# Patient Record
Sex: FEMALE | Race: WHITE | NOT HISPANIC OR LATINO | Employment: FULL TIME | URBAN - METROPOLITAN AREA
[De-identification: names, ages, dates, MRNs, and addresses within clinical notes are randomized per-mention and may not be internally consistent; named-entity substitution may affect disease eponyms.]

---

## 2017-03-18 NOTE — TELEPHONE ENCOUNTER
Birth control      Last Written Prescription Date: 5/31/16  Last Fill Quantity: 84,  # refills: 3   Last Office Visit with G, [facility] or [facility] prescribing provider: 8/15/16

## 2017-03-18 NOTE — TELEPHONE ENCOUNTER
Routing refill request to provider for review/approval because:  Previous provider has left [place], need new provider to sign off on RX.  Svetlana Arita, PharmD.  [facility] [phone]

## 2017-03-20 RX ORDER — LEVONORGESTREL AND ETHINYL ESTRADIOL 0.15-0.03
1 KIT ORAL DAILY
Qty: 84 TABLET | Refills: 0 | Status: SHIPPED | OUTPATIENT
Start: 2017-03-20 | End: 2017-03-23

## 2017-03-23 ENCOUNTER — OFFICE VISIT (OUTPATIENT)
Dept: OBGYN | Facility: CLINIC | Age: 32
End: 2017-03-23
Payer: COMMERCIAL

## 2017-03-23 VITALS
BODY MASS INDEX: 28.72 KG/M2 | SYSTOLIC BLOOD PRESSURE: 130 MMHG | DIASTOLIC BLOOD PRESSURE: 70 MMHG | HEART RATE: 64 BPM | WEIGHT: 183 LBS | HEIGHT: 67 IN

## 2017-03-23 DIAGNOSIS — Z01.419 ENCOUNTER FOR GYNECOLOGICAL EXAMINATION WITHOUT ABNORMAL FINDING: Primary | ICD-10-CM

## 2017-03-23 DIAGNOSIS — Z30.41 ENCOUNTER FOR SURVEILLANCE OF CONTRACEPTIVE PILLS: ICD-10-CM

## 2017-03-23 PROCEDURE — 99385 PREV VISIT NEW AGE 18-39: CPT | Performed by: PHYSICIAN ASSISTANT

## 2017-03-23 PROCEDURE — 87624 HPV HI-RISK TYP POOLED RSLT: CPT | Performed by: PHYSICIAN ASSISTANT

## 2017-03-23 PROCEDURE — G0145 SCR C/V CYTO,THINLAYER,RESCR: HCPCS | Performed by: PHYSICIAN ASSISTANT

## 2017-03-23 RX ORDER — LEVONORGESTREL AND ETHINYL ESTRADIOL 0.15-0.03
1 KIT ORAL DAILY
Qty: 84 TABLET | Refills: 3 | Status: SHIPPED | OUTPATIENT
Start: 2017-03-23 | End: 2018-03-28

## 2017-03-23 ASSESSMENT — ANXIETY QUESTIONNAIRES
2. NOT BEING ABLE TO STOP OR CONTROL WORRYING: NOT AT ALL
GAD7 TOTAL SCORE: 0
5. BEING SO RESTLESS THAT IT IS HARD TO SIT STILL: NOT AT ALL
3. WORRYING TOO MUCH ABOUT DIFFERENT THINGS: NOT AT ALL
6. BECOMING EASILY ANNOYED OR IRRITABLE: NOT AT ALL
IF YOU CHECKED OFF ANY PROBLEMS ON THIS QUESTIONNAIRE, HOW DIFFICULT HAVE THESE PROBLEMS MADE IT FOR YOU TO DO YOUR WORK, TAKE CARE OF THINGS AT HOME, OR GET ALONG WITH OTHER PEOPLE: NOT DIFFICULT AT ALL
7. FEELING AFRAID AS IF SOMETHING AWFUL MIGHT HAPPEN: NOT AT ALL
1. FEELING NERVOUS, ANXIOUS, OR ON EDGE: NOT AT ALL

## 2017-03-23 ASSESSMENT — PATIENT HEALTH QUESTIONNAIRE - PHQ9: 5. POOR APPETITE OR OVEREATING: NOT AT ALL

## 2017-03-23 NOTE — PROGRESS NOTES
Meghann is a 31 year old  female who presents for annual exam.     Besides routine health maintenance, she has no other health concerns today .    HPI:  New pt to clinic. No concerns other than would like refill of ocps. Has been taking for several years and is happy with this method of BC.   Periods regular, light.     Monogamous relationship, declines std screening.   Has not had co-testing, will complete pap and HPV today.     Works as  at [employer]  +exercise, 2-3 days a week.   Moods are generally good.     GYNECOLOGIC HISTORY:    Patient's last menstrual period was 2017 (approximate).  Her current contraception method is: oral contraceptives.  She  reports that she has never smoked. She has never used smokeless tobacco.    Patient is sexually active.  STD testing offered?  Declined  Last PHQ-9 score on record =   PHQ-9 SCORE 3/23/2017   Total Score 1     Last GAD7 score on record =   KRYSTYNA-7 SCORE 3/23/2017   Total Score 0     Alcohol Score = 4    HEALTH MAINTENANCE:  Cholesterol  Cholesterol   Date Value Ref Range Status   2013 176 0 - 200 mg/dL Final     Comment:     LDL Cholesterol is the primary guide to therapy.   The NCEP recommends further evaluation of: patients with cholesterol greater   than 200 mg/dL if additional risk factors are present, cholesterol greater   than   240 mg/dL, triglycerides greater than 150 mg/dL, or HDL less than 40 mg/dL.     Last Mammo: Never, Result: not applicable  Pap:   Lab Results   Component Value Date    PAP NIL 2016    PAP NIL 2013     Colonoscopy:  Never, Result: not applicable  Dexa:  Never    Health maintenance updated:  yes    HISTORY:  Obstetric History       T0      TAB0   SAB0   E0   M0   L0           Patient Active Problem List   Diagnosis     NO ACTIVE PROBLEMS     Fungal skin infection     Family history of basal cell carcinoma     Family history of squamous cell carcinoma of skin     History reviewed. No  "pertinent surgical history.   Social History   Substance Use Topics     Smoking status: Never Smoker     Smokeless tobacco: Never Used     Alcohol use 5.0 oz/week     10 Standard drinks or equivalent per week      Problem (# of Occurrences) Relation (Name,Age of Onset)    CANCER (1) Paternal Aunt: cervical cancer    CEREBROVASCULAR DISEASE (2) Father, Paternal Grandfather    DIABETES (2) Paternal Grandfather, Paternal Uncle    High cholesterol (1) Father    Skin Cancer (1) Father            Current Outpatient Prescriptions   Medication Sig     levonorgestrel-ethinyl estradiol (NORDETTE) 0.15-30 MG-MCG per tablet Take 1 tablet by mouth daily     [DISCONTINUED] levonorgestrel-ethinyl estradiol (NORDETTE) 0.15-30 MG-MCG per tablet Take 1 tablet by mouth daily     fluconazole (DIFLUCAN) 150 MG tablet Take 1 tablet (150 mg) by mouth every 3 days (Patient not taking: Reported on 3/23/2017)     No current facility-administered medications for this visit.      Allergies   Allergen Reactions     Sulfa Drugs Hives       Past medical, surgical, social and family histories were reviewed and updated in EPIC.    ROS:   12 point review of systems negative other than symptoms noted below.  Skin: Rash    EXAM:  /70  Pulse 64  Ht 5' 6.5\" (1.689 m)  Wt 183 lb (83 kg)  LMP 02/26/2017 (Approximate)  BMI 29.09 kg/m2   BMI: Body mass index is 29.09 kg/(m^2).    PHYSICAL EXAM:  Constitutional:  Appearance: Well nourished, well developed, alert, in no acute distress  Neck:  Lymph Nodes:  No lymphadenopathy present    Thyroid:  Gland size normal, nontender, no nodules or masses present  on palpation  Chest:  Respiratory Effort:  Breathing unlabored  Cardiovascular:    Heart: Auscultation:  Regular rate, normal rhythm, no murmurs present  Breasts: Inspection of Breasts:  No lymphadenopathy present    Palpation of Breasts and Axillae:  No masses present on palpation, no  breast tenderness; fibrocystic changes present bilaterally. "     Axillary Lymph Nodes:  No lymphadenopathy present  Gastrointestinal:   Abdominal Examination:  Abdomen nontender to palpation, tone normal without rigidity or guarding, no masses present, umbilicus without lesions   Liver and Spleen:  No hepatomegaly present, liver nontender to palpation    Hernias:  No hernias present  Lymphatic: Lymph Nodes:  No other lymphadenopathy present  Skin:  General Inspection:  No rashes present, no lesions present, no areas of  discoloration    Genitalia and Groin:  No rashes present, no lesions present, no areas of  discoloration, no masses present  Neurologic/Psychiatric:    Mental Status:  Oriented X3     Pelvic Exam:  External Genitalia:     Normal appearance for age, no discharge present, no tenderness present, no inflammatory lesions present, color normal  Vagina:     Normal vaginal vault without central or paravaginal defects, no discharge present, no inflammatory lesions present, no masses present  Bladder:     Nontender to palpation  Urethra:   Urethral Body:  Urethra palpation normal, urethra structural support normal   Urethral Meatus:  No erythema or lesions present  Cervix:     Appearance healthy, no lesions present, nontender to palpation, no bleeding present  Uterus:     Nontender to palpation, no masses present, position anteflexed, mobility: normal  Adnexa:     No adnexal tenderness present, no adnexal masses present  Perineum:     Perineum within normal limits, no evidence of trauma, no rashes or skin lesions present  Anus:     Anus within normal limits, no hemorrhoids present  Inguinal Lymph Nodes:     No lymphadenopathy present  Pubic Hair:     Normal pubic hair distribution for age  Genitalia and Groin:     No rashes present, no lesions present, no areas of discoloration, no masses present    COUNSELING:   Reviewed preventive health counseling, as reflected in patient instructions    BMI: Body mass index is 29.09 kg/(m^2).      ASSESSMENT:  31 year old female  with satisfactory annual exam.    ICD-10-CM    1. Encounter for gynecological examination without abnormal finding Z01.419 Pap imaged thin layer screen with HPV - recommended age 30 - 65     HPV High Risk Types DNA Cervical   2. Encounter for surveillance of contraceptive pills Z30.41 levonorgestrel-ethinyl estradiol (NORDETTE) 0.15-30 MG-MCG per tablet       PLAN:  Risks, benefits, and side effects of medications reviewed.  PHQ-9/KRYSTYNA-7 scores were discussed.  Alcohol score discussed.  Lab and pap smear results will be called to the patient.  Usual safety and preventative measures counseling done.  BMI >25  Doing well with current ocps, refilled as above.     Janae Loya PA-C

## 2017-03-23 NOTE — MR AVS SNAPSHOT
"              After Visit Summary   3/23/2017    Meghann Spencre    MRN: [id number]           Patient Information     Date Of Birth          1985        Visit Information        Provider Department      3/23/2017 3:00 PM Janae Loya PA-C [facility]        Today's Diagnoses     Encounter for gynecological examination without abnormal finding    -  1    Encounter for surveillance of contraceptive pills           Follow-ups after your visit        Who to contact     If you have questions or need follow up information about today's clinic visit or your schedule please contact [facility] directly at [phone].  Normal or non-critical lab and imaging results will be communicated to you by MyChart, letter or phone within 4 business days after the clinic has received the results. If you do not hear from us within 7 days, please contact the clinic through Wizerhart or phone. If you have a critical or abnormal lab result, we will notify you by phone as soon as possible.  Submit refill requests through AdECN or call your pharmacy and they will forward the refill request to us. Please allow 3 business days for your refill to be completed.          Additional Information About Your Visit        MyChart Information     AdECN gives you secure access to your electronic health record. If you see a primary care provider, you can also send messages to your care team and make appointments. If you have questions, please call your primary care clinic.  If you do not have a primary care provider, please call [phone] and they will assist you.        Care EveryWhere ID     This is your Care EveryWhere ID. This could be used by other organizations to access your [facility] medical records  [id number]        Your Vitals Were     Pulse Height Last Period BMI (Body Mass Index)          64 5' 6.5\" (1.689 m) 02/26/2017 (Approximate) 29.09 kg/m2         Blood Pressure from Last 3 " Encounters:   03/23/17 130/70   05/27/16 126/81   06/29/15 114/72    Weight from Last 3 Encounters:   03/23/17 183 lb (83 kg)   05/27/16 178 lb 9.6 oz (81 kg)   06/29/15 190 lb (86.2 kg)              We Performed the Following     HPV High Risk Types DNA Cervical     Pap imaged thin layer screen with HPV - recommended age 30 - 65          Where to get your medicines      These medications were sent to [facility] - [place], [place] - [address]  [address], [place] [address]     Phone:  [phone]     levonorgestrel-ethinyl estradiol 0.15-30 MG-MCG per tablet          Primary Care Provider    None Specified       No primary provider on file.        Thank you!     Thank you for choosing [facility]  for your care. Our goal is always to provide you with excellent care. Hearing back from our patients is one way we can continue to improve our services. Please take a few minutes to complete the written survey that you may receive in the mail after your visit with us. Thank you!             Your Updated Medication List - Protect others around you: Learn how to safely use, store and throw away your medicines at www.disposemymeds.org.          This list is accurate as of: 3/23/17  3:16 PM.  Always use your most recent med list.                   Brand Name Dispense Instructions for use    fluconazole 150 MG tablet    DIFLUCAN    4 tablet    Take 1 tablet (150 mg) by mouth every 3 days       levonorgestrel-ethinyl estradiol 0.15-30 MG-MCG per tablet    NORDETTE    84 tablet    Take 1 tablet by mouth daily

## 2017-03-23 NOTE — LETTER
April 7, 2017    Meghann Spencer  [address] [facility] [address]    Dear Meghann,  We are happy to inform you that your PAP smear result from 3/23/2017 is normal.  We are now able to do a follow up test on PAP smears. The DNA test is for HPV (Human Papilloma Virus). Cervical cancer is closely linked with certain types of HPV. Your result showed no evidence of high risk HPV.  Therefore we recommend you return in 5 years for your next pap smear.  You will still need to return to the clinic every year for an annual exam and other preventive tests.  Please contact the clinic at [phone] with any questions.  Sincerely,    Janae Loya PA-C, PHILIPP

## 2017-03-24 ASSESSMENT — ANXIETY QUESTIONNAIRES: GAD7 TOTAL SCORE: 0

## 2017-03-24 ASSESSMENT — PATIENT HEALTH QUESTIONNAIRE - PHQ9: SUM OF ALL RESPONSES TO PHQ QUESTIONS 1-9: 1

## 2017-03-27 LAB
COPATH REPORT: NORMAL
PAP: NORMAL

## 2017-03-28 LAB
FINAL DIAGNOSIS: NORMAL
HPV HR 12 DNA CVX QL NAA+PROBE: NEGATIVE
HPV16 DNA SPEC QL NAA+PROBE: NEGATIVE
HPV18 DNA SPEC QL NAA+PROBE: NEGATIVE
SPECIMEN DESCRIPTION: NORMAL

## 2017-04-19 ENCOUNTER — MYC REFILL (OUTPATIENT)
Dept: FAMILY MEDICINE | Facility: CLINIC | Age: 32
End: 2017-04-19

## 2017-04-19 DIAGNOSIS — B36.0 TINEA VERSICOLOR: ICD-10-CM

## 2017-04-20 NOTE — TELEPHONE ENCOUNTER
Message from MyChart:  Original authorizing provider: Moira Bah MD    Meghann Spencer would like a refill of the following medications:  fluconazole (DIFLUCAN) 150 MG tablet [Moira Bah MD]    Preferred pharmacy: [facility] - [place], [place] - [address]    Comment:

## 2017-04-20 NOTE — TELEPHONE ENCOUNTER
flucinazole      Last Written Prescription Date: 08/15/16  Last Fill Quantity: 4,  # refills: 1   [facility] Office [facility] with [facility] or [facility] prescribing provider: 08/15/16    Routing refill request to provider for review/approval because:  Drug not on the [facility] refill protocol     Maria Ines Medeiros RN  [facility]  [phone]'

## 2017-04-21 RX ORDER — FLUCONAZOLE 150 MG/1
150 TABLET ORAL
Qty: 4 TABLET | Refills: 1 | Status: SHIPPED | OUTPATIENT
Start: 2017-04-21 | End: 2023-11-27

## 2018-03-28 ENCOUNTER — OFFICE VISIT (OUTPATIENT)
Dept: OBGYN | Facility: CLINIC | Age: 33
End: 2018-03-28
Payer: COMMERCIAL

## 2018-03-28 VITALS
SYSTOLIC BLOOD PRESSURE: 120 MMHG | BODY MASS INDEX: 26.53 KG/M2 | DIASTOLIC BLOOD PRESSURE: 70 MMHG | HEIGHT: 67 IN | WEIGHT: 169 LBS

## 2018-03-28 DIAGNOSIS — Z30.41 ENCOUNTER FOR SURVEILLANCE OF CONTRACEPTIVE PILLS: ICD-10-CM

## 2018-03-28 DIAGNOSIS — Z01.419 ENCOUNTER FOR GYNECOLOGICAL EXAMINATION WITHOUT ABNORMAL FINDING: Primary | ICD-10-CM

## 2018-03-28 PROCEDURE — 99395 PREV VISIT EST AGE 18-39: CPT | Performed by: OBSTETRICS & GYNECOLOGY

## 2018-03-28 RX ORDER — LEVONORGESTREL AND ETHINYL ESTRADIOL 0.15-0.03
1 KIT ORAL DAILY
Qty: 84 TABLET | Refills: 3 | Status: SHIPPED | OUTPATIENT
Start: 2018-03-28 | End: 2019-10-01 | Stop reason: ALTCHOICE

## 2018-03-28 RX ORDER — CETIRIZINE HYDROCHLORIDE 10 MG/1
10 TABLET ORAL DAILY
COMMUNITY

## 2018-03-28 NOTE — NURSING NOTE
"Chief Complaint   Patient presents with     Physical       Initial There were no vitals taken for this visit. Estimated body mass index is 29.09 kg/(m^2) as calculated from the following:    Height as of 3/23/17: 5' 6.5\" (1.689 m).    Weight as of 3/23/17: 183 lb (83 kg).  BP completed using cuff size: regular        The following HM Due: NONE      The following patient reported/Care Every where data was sent to:  P ABSTRACT QUALITY INITIATIVES [20234]  none      n/a              "

## 2018-03-28 NOTE — PROGRESS NOTES
Meghann is a 32 year old  female who presents for annual exam.  Using oral contraceptive pills for contraception, has noted decreased libido, decreased sexual response in the past few years, wonders about role of oral contraceptive pills in these symptoms.  Had also gained some weight, thought this might impact sexual response, but has now lost weight and things didn't improve.  Does not plan pregnancy immediately, wonders about other contraception.     We discussed options, including Nexplanon, barrier methods.  Option of IUD was discussed.  Risks and benefits were reviewed.  Risk of perforation, and subsequent need for surgical removal with laparoscopy or laparotomy was reviewed.  Mirena would be her likely choice.     She will consider options, will call to schedule Mirena insertion (could be done at any time in her pill cycle if pills taken consistently).  For now she will continue the pills.  Also discussed [facility] as resource option.       Menses are regular q 28-30 days and normal lasting 4 days.  Menses flow: normal.  Patient's last menstrual period was 2018.. Using oral contraceptives for contraception.  She is not currently considering pregnancy.  Besides routine health maintenance, she has no other health concerns today .  GYNECOLOGIC HISTORY:  Menarche: 0    Meghann is sexually active with 1male partner(s) and is currently in monogamous relationship with boyfriend.    History sexually transmitted infections:No STD history  STI testing offered?  Declined  CLARK exposure: No  History of abnormal Pap smear: NO - age 30- 65 PAP every 3 years recommended  Family history of breast CA: No  Family history of uterine/ovarian CA: Yes (Please explain): p aunt cervical cancer    Family history of colon CA: No    HEALTH MAINTENANCE:  Cholesterol: (  Cholesterol   Date Value Ref Range Status   2013 176 0 - 200 mg/dL Final     Comment:     LDL Cholesterol is the primary guide to  therapy.   The NCEP recommends further evaluation of: patients with cholesterol greater   than 200 mg/dL if additional risk factors are present, cholesterol greater   than   240 mg/dL, triglycerides greater than 150 mg/dL, or HDL less than 40 mg/dL.    History of abnormal lipids: No  Mammo: 0 . History of abnormal Mammo: Not applicable, 0.  Regular Self Breast Exams: Yes  Calcium/Vitamin D intake: source:  dairy, dietary supplement(s) Adequate? Yes  TSH: (  TSH   Date Value Ref Range Status   2015 1.30 0.40 - 4.00 mU/L Final    )  Pap; (  Lab Results   Component Value Date    PAP NIL 2017    PAP NIL 2016    PAP NIL 2013    )    HISTORY:  Obstetric History       T0      L0     SAB0   TAB0   Ectopic0   Multiple0   Live Births0         Past Medical History:   Diagnosis Date     NO ACTIVE PROBLEMS      No past surgical history on file.  Family History   Problem Relation Age of Onset     CEREBROVASCULAR DISEASE Father      High cholesterol Father      Skin Cancer Father      CEREBROVASCULAR DISEASE Paternal Grandfather      DIABETES Paternal Grandfather      DIABETES Paternal Uncle      CANCER Paternal Aunt      cervical cancer     Social History     Social History     Marital status: Single     Spouse name: N/A     Number of children: N/A     Years of education: N/A     Social History Main Topics     Smoking status: Never Smoker     Smokeless tobacco: Never Used     Alcohol use 5.0 oz/week     10 Standard drinks or equivalent per week     Drug use: No     Sexual activity: Yes     Partners: Male     Birth control/ protection: , Pill     Other Topics Concern     None     Social History Narrative       Current Outpatient Prescriptions:      cetirizine (ZYRTEC) 10 MG tablet, Take 10 mg by mouth daily, Disp: , Rfl:      levonorgestrel-ethinyl estradiol (NORDETTE) 0.15-30 MG-MCG per tablet, Take 1 tablet by mouth daily, Disp: 84 tablet, Rfl: 3     fluconazole (DIFLUCAN) 150 MG tablet,  "Take 1 tablet (150 mg) by mouth every 3 days (Patient not taking: Reported on 3/28/2018), Disp: 4 tablet, Rfl: 1     Allergies   Allergen Reactions     Dust Mites      Sulfa Drugs Hives       Past medical, surgical, social and family history were reviewed and updated in EPIC.    ROS:   C:     NEGATIVE for fever, chills, change in weight  I:       NEGATIVE for worrisome rashes, moles or lesions  E:     NEGATIVE for vision changes or irritation  E/M: NEGATIVE for ear, mouth and throat problems  R:     NEGATIVE for significant cough or SOB  CV:   NEGATIVE for chest pain, palpitations or peripheral edema  GI:     NEGATIVE for nausea, abdominal pain, heartburn, or change in bowel habits  :   NEGATIVE for frequency, dysuria, hematuria, vaginal discharge, or irregular bleeding  M:     NEGATIVE for significant arthralgias or myalgia  N:      NEGATIVE for weakness, dizziness or paresthesias  E:      NEGATIVE for temperature intolerance, skin/hair changes  P:      NEGATIVE for changes in mood or affect.    EXAM:  /70  Ht 5' 7\" (1.702 m)  Wt 169 lb (76.7 kg)  LMP 03/25/2018  Breastfeeding? No  BMI 26.47 kg/m2   BMI: Body mass index is 26.47 kg/(m^2).  Constitutional: healthy, alert and no distress  Head: Normocephalic. No masses, lesions, tenderness or abnormalities  Neck: Neck supple. Trachea midline. No adenopathy. Thyroid symmetric, normal size.   Cardiovascular: RRR.   Respiratory: Negative.   Breast: No nodularity, asymmetry or nipple discharge bilaterally.  Gastrointestinal: Abdomen soft, non-tender, non-distended. No masses, organomegaly.  :  Vulva:  No external lesions, normal female hair distribution, no inguinal adenopathy.    Urethra:  Midline, non-tender, well supported, no discharge  Vagina:  Moist, pink, no abnormal discharge, no lesions  Uterus:  Normal size, non-tender, freely mobile  Ovaries:  No masses appreciated, non-tender, mobile  Rectal Exam: deferred  Musculoskeletal: extremities " normal  Skin: no suspicious lesions or rashes  Psychiatric: Affect appropriate, cooperative,mentation appears normal.     COUNSELING:   Reviewed preventive health counseling, as reflected in patient instructions       Contraception   reports that she has never smoked. She has never used smokeless tobacco.    Body mass index is 26.47 kg/(m^2).  Weight management plan: diet and exercise  FRAX Risk Assessment    ASSESSMENT:  32 year old female with satisfactory annual exam  (Z01.419) Encounter for gynecological examination without abnormal finding  (primary encounter diagnosis)  Comment:   Plan:     (Z30.41) Encounter for surveillance of contraceptive pills  Comment:   Plan: levonorgestrel-ethinyl estradiol (NORDETTE)         0.15-30 MG-MCG per tablet

## 2018-03-28 NOTE — MR AVS SNAPSHOT
"              After Visit Summary   3/28/2018    Meghann Spencer    MRN: [id number]           Patient Information     Date Of Birth          1985        Visit Information        Provider Department      3/28/2018 1:00 PM Christa Sylvester MD [facility]        Today's Diagnoses     Encounter for gynecological examination without abnormal finding    -  1    Encounter for surveillance of contraceptive pills           Follow-ups after your visit        Who to contact     If you have questions or need follow up information about today's clinic visit or your schedule please contact [facility] directly at [phone].  Normal or non-critical lab and imaging results will be communicated to you by MyChart, letter or phone within 4 business days after the clinic has received the results. If you do not hear from us within 7 days, please contact the clinic through Acustreamhart or phone. If you have a critical or abnormal lab result, we will notify you by phone as soon as possible.  Submit refill requests through iCIMS or call your pharmacy and they will forward the refill request to us. Please allow 3 business days for your refill to be completed.          Additional Information About Your Visit        MyChart Information     iCIMS gives you secure access to your electronic health record. If you see a primary care provider, you can also send messages to your care team and make appointments. If you have questions, please call your primary care clinic.  If you do not have a primary care provider, please call [phone] and they will assist you.        Care EveryWhere ID     This is your Care EveryWhere ID. This could be used by other organizations to access your [facility] medical records  [id number]        Your Vitals Were     Height Last Period Breastfeeding? BMI (Body Mass Index)          5' 7\" (1.702 m) 03/25/2018 No 26.47 kg/m2         Blood Pressure from Last 3 Encounters:   03/28/18 120/70 "   03/23/17 130/70   05/27/16 126/81    Weight from Last 3 Encounters:   03/28/18 169 lb (76.7 kg)   03/23/17 183 lb (83 kg)   05/27/16 178 lb 9.6 oz (81 kg)              Today, you had the following     No orders found for display         Where to get your medicines      These medications were sent to [facility] - [place], [place] - [address] [facility]  [address] [facility], [place] [address]     Phone:  [phone]     levonorgestrel-ethinyl estradiol 0.15-30 MG-MCG per tablet          Primary Care Provider Fax #    Physician No Ref-Primary [phone]       No address on file        Equal Access to Services     JAYME VERMA : Tor Little, waricki prak, qaybta kaalmajak perez, marie garnica. [facility] [phone].    ATENCIÓN: Si habla español, tiene a torres disposición servicios gratuitos de asistencia lingüística. [facility] [phone].    We comply with applicable federal civil rights laws and [place] laws. We do not discriminate on the basis of race, color, national origin, age, disability, sex, sexual orientation, or gender identity.            Thank you!     Thank you for choosing [facility]  for your care. Our goal is always to provide you with excellent care. Hearing back from our patients is one way we can continue to improve our services. Please take a few minutes to complete the written survey that you may receive in the mail after your visit with us. Thank you!             Your Updated Medication List - Protect others around you: Learn how to safely use, store and throw away your medicines at www.disposemymeds.org.          This list is accurate as of 3/28/18  1:28 PM.  Always use your most recent med list.                   Brand Name Dispense Instructions for use Diagnosis    cetirizine 10 MG tablet    zyrTEC     Take 10 mg by mouth daily        fluconazole 150 MG tablet    DIFLUCAN    4 tablet    Take 1 tablet (150 mg) by  mouth every 3 days    Tinea versicolor       levonorgestrel-ethinyl estradiol 0.15-30 MG-MCG per tablet    BRAULIO    84 tablet    Take 1 tablet by mouth daily    Encounter for surveillance of contraceptive pills

## 2018-08-15 ENCOUNTER — OFFICE VISIT (OUTPATIENT)
Dept: OBGYN | Facility: CLINIC | Age: 33
End: 2018-08-15
Payer: COMMERCIAL

## 2018-08-15 VITALS
WEIGHT: 171 LBS | BODY MASS INDEX: 26.84 KG/M2 | SYSTOLIC BLOOD PRESSURE: 135 MMHG | DIASTOLIC BLOOD PRESSURE: 98 MMHG | HEIGHT: 67 IN | HEART RATE: 73 BPM

## 2018-08-15 DIAGNOSIS — Z30.430 ENCOUNTER FOR INSERTION OF MIRENA IUD: Primary | ICD-10-CM

## 2018-08-15 LAB — BETA HCG QUAL IFA URINE: NEGATIVE

## 2018-08-15 PROCEDURE — 58300 INSERT INTRAUTERINE DEVICE: CPT | Performed by: OBSTETRICS & GYNECOLOGY

## 2018-08-15 PROCEDURE — 84703 CHORIONIC GONADOTROPIN ASSAY: CPT | Performed by: OBSTETRICS & GYNECOLOGY

## 2018-08-15 NOTE — MR AVS SNAPSHOT
"              After Visit Summary   8/15/2018    Meghann Spencer    MRN: [id number]           Patient Information     Date Of Birth          1985        Visit Information        Provider Department      8/15/2018 1:30 PM Christa Sylvester MD [facility]        Today's Diagnoses     Encounter for insertion of mirena IUD    -  1       Follow-ups after your visit        Who to contact     If you have questions or need follow up information about today's clinic visit or your schedule please contact [facility] directly at [phone].  Normal or non-critical lab and imaging results will be communicated to you by elenihart, letter or phone within 4 business days after the clinic has received the results. If you do not hear from us within 7 days, please contact the clinic through Singspielt or phone. If you have a critical or abnormal lab result, we will notify you by phone as soon as possible.  Submit refill requests through Digby or call your pharmacy and they will forward the refill request to us. Please allow 3 business days for your refill to be completed.          Additional Information About Your Visit        MyChart Information     Digby gives you secure access to your electronic health record. If you see a primary care provider, you can also send messages to your care team and make appointments. If you have questions, please call your primary care clinic.  If you do not have a primary care provider, please call [phone] and they will assist you.        Care EveryWhere ID     This is your Care EveryWhere ID. This could be used by other organizations to access your [place] medical records  [id number]        Your Vitals Were     Pulse Height Last Period Breastfeeding? BMI (Body Mass Index)       73 5' 7\" (1.702 m) 08/05/2018 No 26.78 kg/m2        Blood Pressure from Last 3 Encounters:   08/15/18 (!) 135/98   03/28/18 120/70   03/23/17 130/70    Weight from Last 3 Encounters:   08/15/18 " 171 lb (77.6 kg)   03/28/18 169 lb (76.7 kg)   03/23/17 183 lb (83 kg)              We Performed the Following     Beta HCG qual IFA urine     INSERTION INTRAUTERINE DEVICE          Today's Medication Changes          These changes are accurate as of 8/15/18  1:51 PM.  If you have any questions, ask your nurse or doctor.               Start taking these medicines.        Dose/Directions    levonorgestrel 20 MCG/24HR IUD   Commonly known as:  MIRENA   Used for:  Encounter for insertion of mirena IUD   Started by:  Christa Sylvester MD        Dose:  1 each   1 each (20 mcg) by Intrauterine route once for 1 dose   Quantity:  1 each   Refills:  0            Where to get your medicines      Some of these will need a paper prescription and others can be bought over the counter.  Ask your nurse if you have questions.     You don't need a prescription for these medications     levonorgestrel 20 MCG/24HR IUD                Primary Care Provider Fax #    Physician No Ref-Primary [phone]       No address on file        Equal Access to Services     [facility] : Hadii aad ku hadasho Soomaali, waaxda luqadaha, qaybta kaalmada adeegyada, waxay trav shah . So [facility] [phone].    ATENCIÓN: Si habla español, tiene a torres disposición servicios gratuitos de asistencia lingüística. Llame al [phone].    We comply with applicable federal civil rights laws and [place] laws. We do not discriminate on the basis of race, color, national origin, age, disability, sex, sexual orientation, or gender identity.            Thank you!     Thank you for choosing [facility]  for your care. Our goal is always to provide you with excellent care. Hearing back from our patients is one way we can continue to improve our services. Please take a few minutes to complete the written survey that you may receive in the mail after your visit with us. Thank you!             Your Updated Medication List - Protect  others around you: Learn how to safely use, store and throw away your medicines at www.disposemymeds.org.          This list is accurate as of 8/15/18  1:51 PM.  Always use your most recent med list.                   Brand Name Dispense Instructions for use Diagnosis    cetirizine 10 MG tablet    zyrTEC     Take 10 mg by mouth daily        fluconazole 150 MG tablet    DIFLUCAN    4 tablet    Take 1 tablet (150 mg) by mouth every 3 days    Tinea versicolor       levonorgestrel 20 MCG/24HR IUD    MIRENA    1 each    1 each (20 mcg) by Intrauterine route once for 1 dose    Encounter for insertion of mirena IUD       levonorgestrel-ethinyl estradiol 0.15-30 MG-MCG per tablet    NORDETTE    84 tablet    Take 1 tablet by mouth daily    Encounter for surveillance of contraceptive pills

## 2018-08-15 NOTE — PROGRESS NOTES
"Meghann Spencer is a 33 year old female who presents for IUD insertion.  See previous GYN notes.  We reviewed risks and benefits, and her questions were answered.    OBJECTIVE: healthy female in NAD.  BP (!) 135/98  Pulse 73  Ht 5' 7\" (1.702 m)  Wt 171 lb (77.6 kg)  LMP 08/05/2018  Breastfeeding? No  BMI 26.78 kg/m2.  The uterus was normal sized, mid-position.  Speculum was placed in the vagina, and hibiclens prep used.  The cervix was grasped anteriorly with a tenaculum. The uterus sounded to 7 cm.  Using standard technique, a Mirena IUD was placed in the endometrial cavity without difficulty.  The string was trimmed.  The instruments were removed.  She tolerated the procedure well.    ASSESSMENT: IUD placement.    PLAN: RTC routinely or prn.  Standard precautions.  She will continue oral contraceptive pills for at least 7 days, will check the string at that time.   "

## 2019-10-01 ENCOUNTER — OFFICE VISIT (OUTPATIENT)
Dept: FAMILY MEDICINE | Facility: CLINIC | Age: 34
End: 2019-10-01
Payer: COMMERCIAL

## 2019-10-01 DIAGNOSIS — R07.0 THROAT PAIN: Primary | ICD-10-CM

## 2019-10-01 LAB
DEPRECATED S PYO AG THROAT QL EIA: NORMAL
SPECIMEN SOURCE: NORMAL

## 2019-10-01 PROCEDURE — 87880 STREP A ASSAY W/OPTIC: CPT | Performed by: FAMILY MEDICINE

## 2019-10-01 PROCEDURE — 87081 CULTURE SCREEN ONLY: CPT | Performed by: FAMILY MEDICINE

## 2019-10-01 PROCEDURE — 99202 OFFICE O/P NEW SF 15 MIN: CPT | Performed by: FAMILY MEDICINE

## 2019-10-01 NOTE — PROGRESS NOTES
Subjective     Meghann Spencer is a 34 year old female who presents to clinic today for the following health issues:    HPI   Acute Illness   Acute illness concerns: sore throat   Onset: 3 day     Fever: no     Chills/Sweats: YES- sweats     Headache (location?): no     Sinus Pressure:no    Conjunctivitis:  No,     Ear Pain: no    Rhinorrhea: no     Congestion: no     Sore Throat: no      Cough: no    Wheeze: no     Decreased Appetite: no     Nausea: no     Vomiting: no     Diarrhea:  no     Dysuria/Freq.: no     Fatigue/Achiness: no     Sick/Strep Exposure: no      Therapies Tried and outcome: ibuprofen 800 mg QH PRN     No exposure to sick contact with mono.       Reviewed and updated as needed this visit by Provider         Review of Systems   ROS COMP: Constitutional, HEENT, cardiovascular, pulmonary, gi and gu systems are negative, except as otherwise noted.      Objective    There were no vitals taken for this visit.  There is no height or weight on file to calculate BMI.  Physical Exam   BP (P) 122/80   Pulse (P) 77   Temp (P) 98.1  F (36.7  C) (Oral)   Wt (P) 86 kg (189 lb 8 oz)   SpO2 (P) 100%   BMI (P) 29.68 kg/m    GENERAL: healthy, alert and no distress  EYES: Eyes grossly normal to inspection  HENT: ear canals and TM's normal, nose and mouth without ulcers or lesions  NECK: no adenopathy, no asymmetry, masses, or scars and thyroid normal to palpation  RESP: lungs clear to auscultation - no rales, rhonchi or wheezes  CV: regular rate and rhythm, normal S1 S2    Diagnostic Test Results:  Labs reviewed in Epic  Results for orders placed or performed in visit on 10/01/19 (from the past 24 hour(s))   Strep, Rapid Screen   Result Value Ref Range    Specimen Description Throat     Rapid Strep A Screen       NEGATIVE: No Group A streptococcal antigen detected by immunoassay, await culture report.           Assessment & Plan     1. Throat pain  - Strep, Rapid Screen  - Beta strep group A culture  - recommended  symptomatic tx  - Follow if symptoms worsen or fail to improve.      Maria Isabel Orosco MD  [facility]

## 2019-10-02 LAB
BACTERIA SPEC CULT: NORMAL
SPECIMEN SOURCE: NORMAL

## 2020-01-07 NOTE — PROGRESS NOTES
"[facility] - PRIMARY CARE SKIN    CC: Lesion(s)  SUBJECTIVE:   Meghann Spencer is a(n) 34 year old female who presents to clinic today because of irritation in the right ear that comes and goes .    Issue One:   She had an \" abrasion \" in the right ear that comes and goes. Present for years.  Uses sunscreen : YES, frequency : occasionally when outdoors, SPF : 30.  Previous history of significant sun exposure - blistering sunburns : YES - four times previously, tanning beds : NO.     Personal history of skin cancer : NO.  Family history of skin cancer : YES - squamous cell carcinoma in father and mother.     Occupation : office (indoor).    Refer to electronic medical record (EMR) for past medical history and medications.      ROS: 14 point review of systems was negative except the symptoms listed above in the HPI.    OBJECTIVE:   GENERAL: healthy, alert and no distress.  HEENT: PERRL. Conjunctiva, sclera clear.  SKIN: Lopez Skin Type - I.  Face examined. The dermatoscope was used to help evaluate pigmented lesions.  Skin Pertinent Findings:  Right antihelix- 6 mm X 3 mm scaly, erythematous non indurated lesion consistent with eczema    ASSESSMENT:     Encounter Diagnosis   Name Primary?     Eczema, unspecified type Yes     MDM: This appears to be benign in nature but would like to recheck in 3 months to confirm    PLAN:   Patient Instructions   Hydrocortisone 2.5 % cream apply to affected area on right ear two times per day for 5 days  Recheck in 3 months      TT: 20 minutes.  CT: 15 minutes.    "

## 2020-01-08 ENCOUNTER — OFFICE VISIT (OUTPATIENT)
Dept: FAMILY MEDICINE | Facility: CLINIC | Age: 35
End: 2020-01-08
Payer: COMMERCIAL

## 2020-01-08 VITALS — SYSTOLIC BLOOD PRESSURE: 118 MMHG | DIASTOLIC BLOOD PRESSURE: 72 MMHG

## 2020-01-08 DIAGNOSIS — L30.9 ECZEMA, UNSPECIFIED TYPE: Primary | ICD-10-CM

## 2020-01-08 PROCEDURE — 99213 OFFICE O/P EST LOW 20 MIN: CPT | Performed by: FAMILY MEDICINE

## 2020-01-08 RX ORDER — HYDROCORTISONE 25 MG/G
OINTMENT TOPICAL
Qty: 15 G | Refills: 1 | Status: SHIPPED | OUTPATIENT
Start: 2020-01-08 | End: 2023-11-27

## 2020-01-08 NOTE — LETTER
"    1/8/2020         RE: Meghann Spencer  [address]  [place] [address]        Dear Colleague,    [facility] Please see a copy of my visit note below.    [facility] - PRIMARY CARE SKIN    CC: Lesion(s)  SUBJECTIVE:   Meghann Spencer is a(n) 34 year old female who presents to clinic today because of irritation in the right ear that comes and goes .    Issue One:   She had an \" abrasion \" in the right ear that comes and goes. Present for years.  Uses sunscreen : YES, frequency : occasionally when outdoors, SPF : 30.  Previous history of significant sun exposure - blistering sunburns : YES - four times previously, tanning beds : NO.     Personal history of skin cancer : NO.  Family history of skin cancer : YES - squamous cell carcinoma in father and mother.     Occupation : office (indoor).    Refer to electronic medical record (EMR) for past medical history and medications.      ROS: 14 point review of systems was negative except the symptoms listed above in the HPI.    OBJECTIVE:   GENERAL: healthy, alert and no distress.  HEENT: PERRL. Conjunctiva, sclera clear.  SKIN: Lopez Skin Type - I.  Face examined. The dermatoscope was used to help evaluate pigmented lesions.  Skin Pertinent Findings:  Right antihelix- 6 mm X 3 mm scaly, erythematous non indurated lesion consistent with eczema    ASSESSMENT:     Encounter Diagnosis   Name Primary?     Eczema, unspecified type Yes     MDM: This appears to be benign in nature but would like to recheck in 3 months to confirm    PLAN:   Patient Instructions   Hydrocortisone 2.5 % cream apply to affected area on right ear two times per day for 5 days  Recheck in 3 months      TT: 20 minutes.  CT: 15 minutes.      Again, thank you for allowing me to participate in the care of your patient.        Sincerely,        Moira Bah MD    "

## 2020-01-08 NOTE — PATIENT INSTRUCTIONS
Hydrocortisone 2.5 % ointment  apply to affected area on right ear two times per day for 5 days  Recheck in 3 months

## 2020-03-02 ENCOUNTER — HEALTH MAINTENANCE LETTER (OUTPATIENT)
Age: 35
End: 2020-03-02

## 2020-12-20 ENCOUNTER — HEALTH MAINTENANCE LETTER (OUTPATIENT)
Age: 35
End: 2020-12-20

## 2021-04-18 ENCOUNTER — HEALTH MAINTENANCE LETTER (OUTPATIENT)
Age: 36
End: 2021-04-18

## 2021-10-03 ENCOUNTER — HEALTH MAINTENANCE LETTER (OUTPATIENT)
Age: 36
End: 2021-10-03

## 2022-05-14 ENCOUNTER — HEALTH MAINTENANCE LETTER (OUTPATIENT)
Age: 37
End: 2022-05-14

## 2022-09-04 ENCOUNTER — HEALTH MAINTENANCE LETTER (OUTPATIENT)
Age: 37
End: 2022-09-04

## 2022-10-26 ENCOUNTER — E-VISIT (OUTPATIENT)
Dept: URGENT CARE | Facility: CLINIC | Age: 37
End: 2022-10-26
Payer: COMMERCIAL

## 2022-10-26 DIAGNOSIS — J06.9 VIRAL URI WITH COUGH: Primary | ICD-10-CM

## 2022-10-26 PROCEDURE — 99421 OL DIG E/M SVC 5-10 MIN: CPT | Performed by: PHYSICIAN ASSISTANT

## 2022-10-26 NOTE — PATIENT INSTRUCTIONS
Dear Meghann Spencer    Your symptoms suggest a viral infection. Antibiotics, which treat bacterial infections, will not help with a viral infection. Bacterial infections are usually associated with symptoms greater than 10-14 days, fever > 101 F, and/or significant worsening in symptoms after a temporary improvement. I suggest using over the counter treatments such as Flonase and Mucinex and being re-evaluated if not improving.    Thanks again for choosing us as your health care partner,    Ev Sood PA-C

## 2023-02-02 ENCOUNTER — LAB REQUISITION (OUTPATIENT)
Dept: LAB | Facility: CLINIC | Age: 38
End: 2023-02-02
Payer: COMMERCIAL

## 2023-02-02 DIAGNOSIS — Z01.419 ENCOUNTER FOR GYNECOLOGICAL EXAMINATION (GENERAL) (ROUTINE) WITHOUT ABNORMAL FINDINGS: ICD-10-CM

## 2023-02-02 PROCEDURE — 87624 HPV HI-RISK TYP POOLED RSLT: CPT | Mod: ORL | Performed by: OBSTETRICS & GYNECOLOGY

## 2023-02-02 PROCEDURE — G0145 SCR C/V CYTO,THINLAYER,RESCR: HCPCS | Mod: ORL | Performed by: OBSTETRICS & GYNECOLOGY

## 2023-02-06 LAB
BKR LAB AP GYN ADEQUACY: NORMAL
BKR LAB AP GYN INTERPRETATION: NORMAL
BKR LAB AP HPV REFLEX: NORMAL
BKR LAB AP LMP: NORMAL
BKR LAB AP PREVIOUS ABNL DX: NORMAL
BKR LAB AP PREVIOUS ABNORMAL: NORMAL
PATH REPORT.COMMENTS IMP SPEC: NORMAL
PATH REPORT.COMMENTS IMP SPEC: NORMAL
PATH REPORT.RELEVANT HX SPEC: NORMAL

## 2023-02-07 LAB
HUMAN PAPILLOMA VIRUS 16 DNA: NEGATIVE
HUMAN PAPILLOMA VIRUS 18 DNA: NEGATIVE
HUMAN PAPILLOMA VIRUS FINAL DIAGNOSIS: NORMAL
HUMAN PAPILLOMA VIRUS OTHER HR: NEGATIVE

## 2023-06-03 ENCOUNTER — HEALTH MAINTENANCE LETTER (OUTPATIENT)
Age: 38
End: 2023-06-03

## 2023-11-14 ENCOUNTER — HOSPITAL ENCOUNTER (EMERGENCY)
Facility: CLINIC | Age: 38
Discharge: HOME | End: 2023-11-14
Attending: STUDENT IN AN ORGANIZED HEALTH CARE EDUCATION/TRAINING PROGRAM | Admitting: STUDENT IN AN ORGANIZED HEALTH CARE EDUCATION/TRAINING PROGRAM
Payer: COMMERCIAL

## 2023-11-14 ENCOUNTER — APPOINTMENT (OUTPATIENT)
Dept: GENERAL RADIOLOGY | Facility: CLINIC | Age: 38
End: 2023-11-14
Attending: EMERGENCY MEDICINE
Payer: COMMERCIAL

## 2023-11-14 ENCOUNTER — NURSE TRIAGE (OUTPATIENT)
Dept: NURSING | Facility: CLINIC | Age: 38
End: 2023-11-14
Payer: COMMERCIAL

## 2023-11-14 VITALS
BODY MASS INDEX: 30.8 KG/M2 | SYSTOLIC BLOOD PRESSURE: 126 MMHG | TEMPERATURE: 98 F | WEIGHT: 196.65 LBS | RESPIRATION RATE: 14 BRPM | OXYGEN SATURATION: 97 % | DIASTOLIC BLOOD PRESSURE: 96 MMHG | HEART RATE: 76 BPM

## 2023-11-14 DIAGNOSIS — R06.02 SHORTNESS OF BREATH: ICD-10-CM

## 2023-11-14 LAB
FLUAV RNA SPEC QL NAA+PROBE: NEGATIVE
FLUBV RNA RESP QL NAA+PROBE: NEGATIVE
RSV RNA SPEC NAA+PROBE: NEGATIVE
SARS-COV-2 RNA RESP QL NAA+PROBE: NEGATIVE

## 2023-11-14 PROCEDURE — 99285 EMERGENCY DEPT VISIT HI MDM: CPT | Mod: 25

## 2023-11-14 PROCEDURE — 87637 SARSCOV2&INF A&B&RSV AMP PRB: CPT | Performed by: STUDENT IN AN ORGANIZED HEALTH CARE EDUCATION/TRAINING PROGRAM

## 2023-11-14 PROCEDURE — 71046 X-RAY EXAM CHEST 2 VIEWS: CPT

## 2023-11-14 PROCEDURE — 87637 SARSCOV2&INF A&B&RSV AMP PRB: CPT | Performed by: EMERGENCY MEDICINE

## 2023-11-14 PROCEDURE — 93005 ELECTROCARDIOGRAM TRACING: CPT

## 2023-11-14 RX ORDER — INHALER, ASSIST DEVICES
1 SPACER (EA) MISCELLANEOUS ONCE
Status: DISCONTINUED | OUTPATIENT
Start: 2023-11-14 | End: 2023-11-14 | Stop reason: HOSPADM

## 2023-11-14 ASSESSMENT — ACTIVITIES OF DAILY LIVING (ADL): ADLS_ACUITY_SCORE: 35

## 2023-11-14 NOTE — ED PROVIDER NOTES
"  History     Chief Complaint:  Shortness of Breath       HPI   Meghann Spencer is a 38 year old female who presents with intermittent shortness of breath over the past 3 days, not improving after using her albuterol inhaler this morning. Patient also reports some chest \"tension\" with shortness of breath. Denies chest pain, persistent cough, or leg swelling. She does note producing clear mucous after taking her allergy pill this morning. She had a virtual visit Saturday regarding her symptoms and was prescribed an albuterol inhaler. This was helpful throughout the day yesterday and Saturday. This morning, shortness of breath was constant this morning even after using the inhaler and she called into the nurse line, which referred her here. Shortness of breath was not exacerbated by exertion over the weekend nor today. She reports she has had similar episodes of shortness of breath in the past which lasted a few hours at a time and resolved on their own. The most recent of these occurred about a month ago. Denies nausea and vomiting. Denies leg swelling, recent surgery, current cancer treatment, recent prolonged travel, and hemoptysis. No history of formal asthma diagnosis. Patient is not established with a PCP.  At the time of history, she is not experiencing any shortness of breath, as she states it has been intermittent.    Independent Historian:   None - Patient Only    Review of External Notes:   None       Medications:    Zyrtec  Mirena    Past Medical History:    Allergic rhinitis     Physical Exam   Patient Vitals for the past 24 hrs:   BP Temp Temp src Pulse Resp SpO2 Weight   11/14/23 1403 (!) 126/96 -- -- -- 14 97 % --   11/14/23 1135 139/89 -- -- 76 14 99 % --   11/14/23 0831 (!) 151/107 98  F (36.7  C) Oral 75 20 -- 89.2 kg (196 lb 10.4 oz)        Physical Exam  Vitals: Reviewed, as above.  Notable for initial hypertension.  General: Alert and oriented, in mild distress. Resting on bed.  Skin: Warm and " well-perfused. No rashes, lesions, or erythema.   HEENT:   Head: Normocephalic, atraumatic. Facial features symmetric.   Eyes: Conjunctiva pink, sclera white. EOMs grossly intact.   Ears: Auricles without lesion, erythema, or edema. TMs clear bilaterally.  Nose: Symmetric with no discharge.  Mouth and throat: Lips are moist. Buccal mucosa is pink and moist without lesions. Oropharyngeal mucosa is pink and moist with no erythema, edema, or exudate. Uvula is midline.  Neck: Supple with no lymphadenopathy. Full ROM.   Pulmonary: Chest wall expansion symmetric with no increased work of breathing. Lungs clear to auscultation bilaterally.   Cardiovascular: Heart RRR with no murmurs. 2+ radial and tibialis posterior pulses bilaterally. No peripheral edema. No calf tenderness.  Abdominal: No hernias or distension. Bowel sounds present and physiologic. Abdomen is soft and nontender to light and deep palpation in all 4 quadrants with no guarding or rebound. No masses or organomegaly.   Musculoskeletal: Moves all extremities spontaneously.  Psych: Affect appropriate.  Answers questions appropriately. Patient appears calm.      Emergency Department Course   ECG  ECG taken at 0857, ECG read at 0901  Normal sinus rhythm. Normal ECG.   Rate 74 bpm. WA interval 146 ms. QRS duration 88 ms. QT/QTc 414/459 ms. P-R-T axes 23 9 11.     Imaging:  XR Chest 2 Views   Final Result   IMPRESSION: No focal consolidation, pleural effusion or pneumothorax.   Cardiomediastinal silhouette is unremarkable.       JERAD SIMS MD            SYSTEM ID:  [id number]         Report per radiology    Laboratory:  Labs Ordered and Resulted from Time of ED Arrival to Time of ED Departure   INFLUENZA A/B, RSV, & SARS-COV2 PCR - Normal       Result Value    Influenza A PCR Negative      Influenza B PCR Negative      RSV PCR Negative      SARS CoV2 PCR Negative        Emergency Department Course & Assessments:     Interventions:  Medications - No data  to display     Independent Interpretation (X-rays, CTs, rhythm strip):  X ray with no pneumothorax or infiltrate    Assessments/Consultations/Discussion of Management or Tests:  ED Course as of 11/14/23 1637   Tue Nov 14, 2023   1315 I obtained the history and examined the patient as noted above.        Social Determinants of Health affecting care:   None    Disposition:  The patient was discharged to home.     Impression & Plan    Medical Decision Making:  Meghann Spencer is a 38 year old female who presents with intermittent shortness of breath over the past 3 days, not improving after using her albuterol inhaler this morning.  Please see HPI and exam for details.  Differential includes COVID, influenza, pneumonia, pneumothorax, ACS, PE, among others.  Patient has a reassuring physical exam with stable vitals.  She is low risk by Wells and is PERC negative.  She is not having any chest pain, her symptoms are not exertional, and EKG with no signs of ischemia.  Influenza, COVID, and RSV testing returned negative.  Chest x-ray was negative for acute pathology.  Patient initially had improvement of her symptoms with albuterol inhaler few days ago, however this stopped working today.  I did provide her with a spacing device, which was given to her while here.  I also provided her with a primary care referral, as she may benefit from a formal pulmonology consultation to establish a formal diagnosis of asthma or other lung disease.  Return precautions were discussed, including chest pain, worsening shortness of breath, hemoptysis, fevers, or other new concerns.  She is comfortable with this plan and appears reliable to follow-up.       Diagnosis:    ICD-10-CM    1. Shortness of breath  R06.02 Primary Care Referral           Scribe Disclosure:  I, Coni Chowdary, am serving as a scribe at 1:39 PM on 11/14/2023 to document services personally performed by La Melendrez PA-C based on my observations and the provider's statements  to me.     11/14/2023   La Melendrez PA-C Sells, Jenna, PA-C  11/14/23 1637       La Melendrez PA-C  11/14/23 1638

## 2023-11-14 NOTE — ED TRIAGE NOTES
Started on Thursday feels like I can't get enough air .      Triage Assessment (Adult)       Row Name 11/14/23 0865          Triage Assessment    Airway WDL WDL        Respiratory WDL    Respiratory WDL WDL        Skin Circulation/Temperature WDL    Skin Circulation/Temperature WDL WDL        Peripheral/Neurovascular WDL    Peripheral Neurovascular WDL WDL        Cognitive/Neuro/Behavioral WDL    Cognitive/Neuro/Behavioral WDL WDL

## 2023-11-14 NOTE — TELEPHONE ENCOUNTER
"Nurse Triage SBAR    Is this a 2nd Level Triage? NO    Situation:   Patient reporting she was prescribed Albuterol inhaler through a Virtual Visit 11/13/23 with [facility].  Reports increased shortness of breath.    Background:   11/12/23 COVID 19 testing negative.    Assessment:   Patient calling reporting symptoms started 6 days ago with feeling \"shortness of breath.\"  COVID 19 testing negative. Stating she always has a lot of mucus due to allergies that remains clear.  Reporting she started using Albuterol inhaler yesterday at 2 pm and again at 730 pm, last using it at 6 a.m. this morning.  Reporting some relief with 1st 2 doses, no improvement after using dose at 6 a.m..  Describes constant chest tightness, difficulty getting full breath \"more labored breathing.\"  Reporting some shortness of breath with completing sentences at rest.  Pulse 72. Patient does not have an oximeter.    Protocol Recommended Disposition:   See in ED. Patient agrees to go into [facility] ED now.      Reason for Disposition   Chest pain  (Exception: Mild chest tightness that feels the same as prior asthma attacks.)    Additional Information   Negative: SEVERE asthma attack (e.g., struggling for each breath, speaks in single words, loud wheezes, pulse >120)  (RED  Zone)   Negative: Bluish (or gray) lips or face now   Negative: Difficult to awaken or acting confused (e.g., disoriented, slurred speech)   Negative: Passed out (i.e., lost consciousness, collapsed and was not responding)   Negative: Wheezing started suddenly after medicine, an allergic food, or bee sting   Negative: Sounds like a life-threatening emergency to the triager   Negative: [1] MODERATE asthma attack (e.g., SOB at rest, speaks in phrases, audible wheezes) AND [2] doesn't have neb or inhaler available   Negative: Peak flow rate less than 50% of baseline level  (RED  Zone)   Negative: Oxygen level (e.g., pulse oximetry) 90 percent or lower   Negative: [1] Hospitalized before " with asthma AND [2] feels the same now    Protocols used: Asthma Attack-A-AH

## 2023-11-15 LAB
ATRIAL RATE - MUSE: 74 BPM
DIASTOLIC BLOOD PRESSURE - MUSE: NORMAL MMHG
INTERPRETATION ECG - MUSE: NORMAL
P AXIS - MUSE: 23 DEGREES
PR INTERVAL - MUSE: 146 MS
QRS DURATION - MUSE: 88 MS
QT - MUSE: 414 MS
QTC - MUSE: 459 MS
R AXIS - MUSE: 9 DEGREES
SYSTOLIC BLOOD PRESSURE - MUSE: NORMAL MMHG
T AXIS - MUSE: 11 DEGREES
VENTRICULAR RATE- MUSE: 74 BPM

## 2023-11-27 ENCOUNTER — OFFICE VISIT (OUTPATIENT)
Dept: FAMILY MEDICINE | Facility: CLINIC | Age: 38
End: 2023-11-27
Payer: COMMERCIAL

## 2023-11-27 ENCOUNTER — TELEPHONE (OUTPATIENT)
Dept: FAMILY MEDICINE | Facility: CLINIC | Age: 38
End: 2023-11-27

## 2023-11-27 VITALS
DIASTOLIC BLOOD PRESSURE: 88 MMHG | TEMPERATURE: 97.8 F | HEIGHT: 67 IN | SYSTOLIC BLOOD PRESSURE: 129 MMHG | RESPIRATION RATE: 8 BRPM | HEART RATE: 76 BPM | OXYGEN SATURATION: 100 % | WEIGHT: 199 LBS | BODY MASS INDEX: 31.23 KG/M2

## 2023-11-27 DIAGNOSIS — R06.02 SHORTNESS OF BREATH: Primary | ICD-10-CM

## 2023-11-27 LAB
ALBUMIN SERPL BCG-MCNC: 4.6 G/DL (ref 3.5–5.2)
ALP SERPL-CCNC: 57 U/L (ref 40–150)
ALT SERPL W P-5'-P-CCNC: 9 U/L (ref 0–50)
ANION GAP SERPL CALCULATED.3IONS-SCNC: 10 MMOL/L (ref 7–15)
AST SERPL W P-5'-P-CCNC: 16 U/L (ref 0–45)
BILIRUB SERPL-MCNC: 0.6 MG/DL
BUN SERPL-MCNC: 9 MG/DL (ref 6–20)
CALCIUM SERPL-MCNC: 9.8 MG/DL (ref 8.6–10)
CHLORIDE SERPL-SCNC: 104 MMOL/L (ref 98–107)
CREAT SERPL-MCNC: 0.81 MG/DL (ref 0.51–0.95)
DEPRECATED HCO3 PLAS-SCNC: 25 MMOL/L (ref 22–29)
EGFRCR SERPLBLD CKD-EPI 2021: >90 ML/MIN/1.73M2
ERYTHROCYTE [DISTWIDTH] IN BLOOD BY AUTOMATED COUNT: 13.1 % (ref 10–15)
FERRITIN SERPL-MCNC: 74 NG/ML (ref 6–175)
GLUCOSE SERPL-MCNC: 84 MG/DL (ref 70–99)
HCT VFR BLD AUTO: 43.4 % (ref 35–47)
HGB BLD-MCNC: 14 G/DL (ref 11.7–15.7)
IRON BINDING CAPACITY (ROCHE): 238 UG/DL (ref 240–430)
IRON SATN MFR SERPL: 59 % (ref 15–46)
IRON SERPL-MCNC: 141 UG/DL (ref 37–145)
MCH RBC QN AUTO: 29 PG (ref 26.5–33)
MCHC RBC AUTO-ENTMCNC: 32.3 G/DL (ref 31.5–36.5)
MCV RBC AUTO: 90 FL (ref 78–100)
PLATELET # BLD AUTO: 338 10E3/UL (ref 150–450)
POTASSIUM SERPL-SCNC: 4.5 MMOL/L (ref 3.4–5.3)
PROT SERPL-MCNC: 7.3 G/DL (ref 6.4–8.3)
RBC # BLD AUTO: 4.83 10E6/UL (ref 3.8–5.2)
SODIUM SERPL-SCNC: 139 MMOL/L (ref 135–145)
WBC # BLD AUTO: 10 10E3/UL (ref 4–11)

## 2023-11-27 PROCEDURE — 85027 COMPLETE CBC AUTOMATED: CPT | Performed by: NURSE PRACTITIONER

## 2023-11-27 PROCEDURE — 82728 ASSAY OF FERRITIN: CPT | Performed by: NURSE PRACTITIONER

## 2023-11-27 PROCEDURE — 83540 ASSAY OF IRON: CPT | Performed by: NURSE PRACTITIONER

## 2023-11-27 PROCEDURE — 99204 OFFICE O/P NEW MOD 45 MIN: CPT | Performed by: NURSE PRACTITIONER

## 2023-11-27 PROCEDURE — 80053 COMPREHEN METABOLIC PANEL: CPT | Performed by: NURSE PRACTITIONER

## 2023-11-27 PROCEDURE — 36415 COLL VENOUS BLD VENIPUNCTURE: CPT | Performed by: NURSE PRACTITIONER

## 2023-11-27 PROCEDURE — 83550 IRON BINDING TEST: CPT | Performed by: NURSE PRACTITIONER

## 2023-11-27 ASSESSMENT — PAIN SCALES - GENERAL: PAINLEVEL: NO PAIN (0)

## 2023-11-27 NOTE — PATIENT INSTRUCTIONS
Start omeprazole 20mg daily.  Take it 30-60 minutes prior to eating and drinking.  Do this for 3 weeks.  If effective, start to decrease use of this slowly until you are using it as needed only.    Also, schedule with [facility] in [place].  You will need to call them to schedule.  You can always cancel this if you are noting improvement with the omeprazole.

## 2023-11-27 NOTE — TELEPHONE ENCOUNTER
Pt calls,    S-(situation): saw La this morning    B-(background): wonders if omeprazole rx was going to be sent or if buys OTC    A-(assessment): medication question    R-(recommendations): routed to La, please confirm, send if appropriate AND INFORM pt     Telephone Information:   Mobile [phone]         Lona Scott RN, BSN  [facility]

## 2023-11-27 NOTE — PROGRESS NOTES
"  Assessment & Plan     Shortness of breath  Normal EKG and cxr in ED.  No hx of asthma.  Asymptomatic in clinic.  Discussed additional eval with labs and allergy and asthma.  Wondering about vocal chord dysfunction as an etiology.  Also, start omeprazole daily for 3 weeks to rule out reflux.  Pt agrees with plan and verbalized understanding.  - Primary Care Referral  - CBC with platelets; Future  - Ferritin; Future  - Iron and iron binding capacity; Future  - Comprehensive metabolic panel (BMP + Alb, Alk Phos, ALT, AST, Total. Bili, TP); Future  - Adult Allergy/Asthma  Referral; Future  - CBC with platelets  - Ferritin  - Iron and iron binding capacity  - Comprehensive metabolic panel (BMP + Alb, Alk Phos, ALT, AST, Total. Bili, TP)    Reviewed chart for 10 minutes.     MED REC REQUIRED  Post Medication Reconciliation Status:   BMI:   Estimated body mass index is 31.17 kg/m  as calculated from the following:    Height as of this encounter: 1.702 m (5' 7\").    Weight as of this encounter: 90.3 kg (199 lb).           MELODY Singh Ra, CNP  [facility] LIZZIE Zavaleta is a 38 year old, presenting for the following health issues:  Hospital F/U and Establish Care        11/27/2023     9:07 AM   Additional Questions   Roomed by Yuli Mccarty VF     Would like to establish care.     After ER visit, symptoms did not get better so was prescribed 3 days worth of prednisone which helped, but once she was out, SOB worsened again.    Talking and laying down make breathing easier. Sitting for long periods of time makes it worse.     Is wondering if it could be acid reflux related.       HPI         Hospital Follow-up Visit:    Hospital/Nursing Home/IP Rehab Facility: [facility]  Date of Admission: 11/14/23  Date of Discharge: 11/14/23  Reason(s) for Admission: Shortness of breath    Was your hospitalization related to COVID-19? No   Problems taking medications " "regularly:  None  Medication changes since discharge: None  Problems adhering to non-medication therapy:  None    Reviewed ED visit.  Symptoms present for 3 weeks.  Intermittent SOB.  Not associated with exertion.  She does walk for exercise.  She does feel that if she is walking at an incline or walking faster that she is more SOB than she has been in the past.  No chest pain, palpitations.  No LE edema, hx of blood clots, recent travel.  No URI.  Covid infection was 1.5 years ago, no residual symptoms noted.  She has been eating and drinking as usual.  No change in bowels.  No weight change.  No hx of GERD, but wondering if this could be the cause as she notes symptoms sometimes after eating and at night.  She was given albuterol through an e visit and did not find this helpful.  She was also given 3 days of PO prednisone and this did seem to help.  No smoking.  No menses, has an IUD in place.    Plan of care communicated with patient                   Review of Systems   Constitutional, HEENT, cardiovascular, pulmonary, gi and gu systems are negative, except as otherwise noted.      Objective    /88 (BP Location: Right arm, Patient Position: Sitting, Cuff Size: Adult Large)   Pulse 76   Temp 97.8  F (36.6  C) (Oral)   Resp (!) 8   Ht 1.702 m (5' 7\")   Wt 90.3 kg (199 lb)   SpO2 100%   BMI 31.17 kg/m    Body mass index is 31.17 kg/m .  Physical Exam   GENERAL: healthy, alert and no distress  HENT: ear canals and TM's normal, nose and mouth without ulcers or lesions  NECK: no adenopathy, no asymmetry, masses, or scars and thyroid normal to palpation  RESP: lungs clear to auscultation - no rales, rhonchi or wheezes  CV: regular rate and rhythm, normal S1 S2, no S3 or S4, no murmur, click or rub, no peripheral edema and peripheral pulses strong  ABDOMEN: soft, nontender, no hepatosplenomegaly, no masses and bowel sounds normal  PSYCH: mentation appears normal, affect normal/bright                      "

## 2023-11-27 NOTE — TELEPHONE ENCOUNTER
Called patient and left voicemail to call back and ask to speak to any triage nurse.     Rubia Selby RN

## 2023-11-28 DIAGNOSIS — R06.02 SHORTNESS OF BREATH: ICD-10-CM

## 2023-12-07 ENCOUNTER — TRANSFERRED RECORDS (OUTPATIENT)
Dept: HEALTH INFORMATION MANAGEMENT | Facility: CLINIC | Age: 38
End: 2023-12-07
Payer: COMMERCIAL

## 2023-12-21 ENCOUNTER — TRANSFERRED RECORDS (OUTPATIENT)
Dept: HEALTH INFORMATION MANAGEMENT | Facility: CLINIC | Age: 38
End: 2023-12-21
Payer: COMMERCIAL

## 2024-01-09 ENCOUNTER — VIRTUAL VISIT (OUTPATIENT)
Dept: FAMILY MEDICINE | Facility: CLINIC | Age: 39
End: 2024-01-09
Payer: COMMERCIAL

## 2024-01-09 DIAGNOSIS — R06.02 SHORTNESS OF BREATH: Primary | ICD-10-CM

## 2024-01-09 PROCEDURE — 99213 OFFICE O/P EST LOW 20 MIN: CPT | Mod: 95 | Performed by: NURSE PRACTITIONER

## 2024-01-09 ASSESSMENT — ENCOUNTER SYMPTOMS: SHORTNESS OF BREATH: 1

## 2024-01-09 NOTE — PROGRESS NOTES
"Meghann is a 38 year old who is being evaluated via a billable video visit.      How would you like to obtain your AVS? MyChart  If the video visit is dropped, the invitation should be resent by:   Will anyone else be joining your video visit? No          Assessment & Plan     Shortness of breath  Chronic issue.  No pulmonary cause found.  No GI cause found.  Will rule out cardiac etiology, also check TSH.  Follow up after testing.  If normal, consider treatment for anxiety.  - Adult Leadless EKG Monitor 3 to 7 Days; Future  - Echocardiogram Complete; Future      Reviewed chart for 10 minutes.       BMI:   Estimated body mass index is 31.17 kg/m  as calculated from the following:    Height as of 11/27/23: 1.702 m (5' 7\").    Weight as of 11/27/23: 90.3 kg (199 lb).           MELODY Singh Ra, CNP  [facility] [place]    Subjective   Meghann is a 38 year old, presenting for the following health issues:  Shortness of Breath      1/9/2024     1:47 PM   Additional Questions   Roomed by JESS MELENDREZ CMA   Accompanied by SELF         1/9/2024     1:47 PM   Patient Reported Additional Medications   Patient reports taking the following new medications NA       Shortness of Breath     SOB SINCE EARLY NOVEMEBER     Continued sob.  Although she has had brief periods of improvement, her symptoms have persisted.  She continues to note sob throughout the course of the day.  She has made lifestyle changes including diet changes, elimination of caffeine, and regular exercise.  She does feel like this has helped slightly.  She has seen allergy and asthma and they did not find a pulmonary etiology.  She had brief improvement with omeprazole as well.    She does not feel there has been an increase in stress to cause her symptoms, and feels her stress level is generally well tolerated, although not nonexistent.      Review of Systems   Respiratory:  Positive for shortness of breath.             Objective     "       Vitals:  No vitals were obtained today due to virtual visit.    Physical Exam   GENERAL: Healthy, alert and no distress  EYES: Eyes grossly normal to inspection.  No discharge or erythema, or obvious scleral/conjunctival abnormalities.  RESP: No audible wheeze, cough, or visible cyanosis.  No visible retractions or increased work of breathing.    SKIN: Visible skin clear. No significant rash, abnormal pigmentation or lesions.  NEURO: Cranial nerves grossly intact.  Mentation and speech appropriate for age.  PSYCH: Mentation appears normal, affect normal/bright, judgement and insight intact, normal speech and appearance well-groomed.                Video-Visit Details    Type of service:  Video Visit     Originating Location (pt. Location): Home    Distant Location (provider location):  On-site  Platform used for Video Visit: [employer]

## 2024-01-16 ENCOUNTER — HOSPITAL ENCOUNTER (OUTPATIENT)
Dept: CARDIOLOGY | Facility: CLINIC | Age: 39
Discharge: HOME | End: 2024-01-16
Attending: NURSE PRACTITIONER
Payer: COMMERCIAL

## 2024-01-16 DIAGNOSIS — R06.02 SHORTNESS OF BREATH: ICD-10-CM

## 2024-01-16 LAB — LVEF ECHO: NORMAL

## 2024-01-16 PROCEDURE — 93242 EXT ECG>48HR<7D RECORDING: CPT

## 2024-01-16 PROCEDURE — 255N000002 HC RX 255 OP 636: Performed by: NURSE PRACTITIONER

## 2024-01-16 PROCEDURE — 93306 TTE W/DOPPLER COMPLETE: CPT | Mod: 26 | Performed by: INTERNAL MEDICINE

## 2024-01-16 PROCEDURE — 999N000208 ECHOCARDIOGRAM COMPLETE

## 2024-01-16 RX ADMIN — HUMAN ALBUMIN MICROSPHERES AND PERFLUTREN 3 ML: 10; .22 INJECTION, SOLUTION INTRAVENOUS at 09:08

## 2024-01-18 ENCOUNTER — LAB (OUTPATIENT)
Dept: LAB | Facility: CLINIC | Age: 39
End: 2024-01-18
Payer: COMMERCIAL

## 2024-01-18 DIAGNOSIS — R06.02 SHORTNESS OF BREATH: ICD-10-CM

## 2024-01-18 DIAGNOSIS — Z11.59 NEED FOR HEPATITIS C SCREENING TEST: ICD-10-CM

## 2024-01-18 DIAGNOSIS — Z11.4 SCREENING FOR HIV (HUMAN IMMUNODEFICIENCY VIRUS): Primary | ICD-10-CM

## 2024-01-18 PROCEDURE — 84443 ASSAY THYROID STIM HORMONE: CPT

## 2024-01-18 PROCEDURE — 36415 COLL VENOUS BLD VENIPUNCTURE: CPT

## 2024-01-19 LAB — TSH SERPL DL<=0.005 MIU/L-ACNC: 1.88 UIU/ML (ref 0.3–4.2)

## 2024-01-30 PROCEDURE — 93244 EXT ECG>48HR<7D REV&INTERPJ: CPT | Performed by: INTERNAL MEDICINE

## 2024-02-01 ENCOUNTER — TRANSFERRED RECORDS (OUTPATIENT)
Dept: HEALTH INFORMATION MANAGEMENT | Facility: CLINIC | Age: 39
End: 2024-02-01
Payer: COMMERCIAL

## 2024-04-28 ENCOUNTER — HEALTH MAINTENANCE LETTER (OUTPATIENT)
Age: 39
End: 2024-04-28

## 2025-05-11 ENCOUNTER — HEALTH MAINTENANCE LETTER (OUTPATIENT)
Age: 40
End: 2025-05-11